# Patient Record
Sex: MALE | Race: WHITE | NOT HISPANIC OR LATINO | Employment: FULL TIME | RURAL
[De-identification: names, ages, dates, MRNs, and addresses within clinical notes are randomized per-mention and may not be internally consistent; named-entity substitution may affect disease eponyms.]

---

## 2022-04-03 ENCOUNTER — HOSPITAL ENCOUNTER (EMERGENCY)
Facility: HOSPITAL | Age: 42
Discharge: HOME | End: 2022-04-03
Attending: EMERGENCY MEDICINE

## 2022-04-03 VITALS
WEIGHT: 159.13 LBS | DIASTOLIC BLOOD PRESSURE: 94 MMHG | BODY MASS INDEX: 24.12 KG/M2 | OXYGEN SATURATION: 96 % | HEIGHT: 68 IN | HEART RATE: 65 BPM | TEMPERATURE: 99 F | RESPIRATION RATE: 18 BRPM | SYSTOLIC BLOOD PRESSURE: 149 MMHG

## 2022-04-03 DIAGNOSIS — S93.492A SPRAIN OF ANTERIOR TALOFIBULAR LIGAMENT OF LEFT ANKLE, INITIAL ENCOUNTER: Primary | ICD-10-CM

## 2022-04-03 DIAGNOSIS — S99.922D FOOT INJURY, LEFT, SUBSEQUENT ENCOUNTER: ICD-10-CM

## 2022-04-03 PROCEDURE — 99283 EMERGENCY DEPT VISIT LOW MDM: CPT | Mod: 25

## 2022-04-03 PROCEDURE — 25000003 PHARM REV CODE 250: Performed by: EMERGENCY MEDICINE

## 2022-04-03 PROCEDURE — 29515 APPLICATION SHORT LEG SPLINT: CPT

## 2022-04-03 PROCEDURE — 99283 PR EMERGENCY DEPT VISIT,LEVEL III: ICD-10-PCS | Mod: ,,, | Performed by: EMERGENCY MEDICINE

## 2022-04-03 PROCEDURE — 99283 EMERGENCY DEPT VISIT LOW MDM: CPT | Mod: ,,, | Performed by: EMERGENCY MEDICINE

## 2022-04-03 RX ORDER — IBUPROFEN 400 MG/1
400 TABLET ORAL
Status: COMPLETED | OUTPATIENT
Start: 2022-04-03 | End: 2022-04-03

## 2022-04-03 RX ADMIN — IBUPROFEN 400 MG: 400 TABLET, FILM COATED ORAL at 05:04

## 2022-04-03 NOTE — ED PROVIDER NOTES
Encounter Date: 4/3/2022       History     Chief Complaint   Patient presents with    Foot Injury     left     Patient presents with pain and swelling of the proximal foot just inferior and slightly lateral to the lateral malleolus.        Review of patient's allergies indicates:  No Known Allergies  History reviewed. No pertinent past medical history.  History reviewed. No pertinent surgical history.  History reviewed. No pertinent family history.  Social History     Tobacco Use    Smoking status: Former Smoker    Smokeless tobacco: Never Used   Substance Use Topics    Alcohol use: Yes    Drug use: Never     Review of Systems   Constitutional: Negative.    HENT: Negative.    Eyes: Negative.    Respiratory: Negative.    Cardiovascular: Negative.    Gastrointestinal: Negative.    Genitourinary: Negative.    Musculoskeletal: Positive for joint swelling (Has pain and swelling left inferolateral ankle joint.). Negative for back pain and neck pain.   Skin: Negative.    Neurological: Negative.    Psychiatric/Behavioral: Negative.    All other systems reviewed and are negative.      Physical Exam     Initial Vitals [04/03/22 1702]   BP Pulse Resp Temp SpO2   (!) 149/94 65 18 99.1 °F (37.3 °C) 96 %      MAP       --         Physical Exam    Nursing note and vitals reviewed.  Constitutional: He appears well-developed and well-nourished.   HENT:   Head: Normocephalic and atraumatic.   Right Ear: External ear normal.   Left Ear: External ear normal.   Eyes: Conjunctivae are normal. Pupils are equal, round, and reactive to light.   Neck:   Normal range of motion.  Cardiovascular: Normal rate, regular rhythm and normal heart sounds.   Pulmonary/Chest: Breath sounds normal.   Abdominal: Abdomen is soft. Bowel sounds are normal.   Musculoskeletal:         General: Tenderness (Patient has pain and swelling in the area of the anterior talofibular ligament.  No instability.  There is slight ecchymosis in this area as well.)  present. Normal range of motion.      Cervical back: Normal range of motion.     Neurological: He is alert and oriented to person, place, and time. He has normal strength. No cranial nerve deficit. GCS score is 15. GCS eye subscore is 4. GCS verbal subscore is 5. GCS motor subscore is 6.   Skin: Skin is warm and dry. Capillary refill takes less than 2 seconds.         Medical Screening Exam   See Full Note    ED Course   Procedures  Labs Reviewed - No data to display       Imaging Results          X-Ray Foot Complete Left (Final result)  Result time 04/03/22 17:31:51    Final result by Garcia Irving II, MD (04/03/22 17:31:51)                 Impression:      No evidence of abnormality demonstrated      Electronically signed by: Garcia Irving  Date:    04/03/2022  Time:    17:31             Narrative:    EXAMINATION:  XR FOOT COMPLETE 3 VIEW LEFT    CLINICAL HISTORY:  Unspecified injury of left foot, subsequent encounter    COMPARISON:  None available    FINDINGS:  No evidence of fracture seen.  The alignment of the joints appears normal.  No degenerative change is present.  No soft tissue abnormality is seen.                                 Medications   ibuprofen tablet 400 mg (400 mg Oral Given 4/3/22 1741)     Medical Decision Making:   Independently Interpreted Test(s):   I have ordered and independently interpreted X-rays - see summary below.       <> Summary of X-Ray Reading(s): X-ray of left foot/ankle reviewed, no fracture or dislocation seen.  Clinical Tests:   Radiological Study: Reviewed  Radiologist report for x-ray of the left foot/ankle reviewed, no fracture, no dislocation.                 Clinical Impression:   Final diagnoses:  [S99.922D] Foot injury, left, subsequent encounter  [S93.492A] Sprain of anterior talofibular ligament of left ankle, initial encounter (Primary)          ED Disposition Condition    Discharge Stable        ED Prescriptions     None        Follow-up Information      Follow up With Specialties Details Why Contact Info    Antonietta Ochoa MD Family Medicine Schedule an appointment as soon as possible for a visit in 1 week To recheck, sooner if worse, not improving, or if any new symptoms. [id number] [place] [address]  [phone]             Mono Scott DO  04/03/22 1739       Mono Scott DO  04/03/22 1744

## 2022-04-03 NOTE — ED TRIAGE NOTES
Left foot injury after jumping down off of a platform 1-2 foot high. Pt has pain and bruising noted to lateral portion of top of left foot. Skin warm to touch. Strong palpable pulse. Pt able to ambulate by putting pressure on ball of foot but painful to stand flat.

## 2022-04-05 ENCOUNTER — TELEPHONE (OUTPATIENT)
Dept: EMERGENCY MEDICINE | Facility: HOSPITAL | Age: 42
End: 2022-04-05